# Patient Record
Sex: FEMALE | Race: WHITE
[De-identification: names, ages, dates, MRNs, and addresses within clinical notes are randomized per-mention and may not be internally consistent; named-entity substitution may affect disease eponyms.]

---

## 2019-09-12 ENCOUNTER — HOSPITAL ENCOUNTER (INPATIENT)
Dept: HOSPITAL 56 - MW.OBCHECK | Age: 29
LOS: 5 days | Discharge: HOME | DRG: 560 | End: 2019-09-17
Attending: OBSTETRICS & GYNECOLOGY | Admitting: OBSTETRICS & GYNECOLOGY
Payer: COMMERCIAL

## 2019-09-12 DIAGNOSIS — O48.0: ICD-10-CM

## 2019-09-12 DIAGNOSIS — Z87.891: ICD-10-CM

## 2019-09-12 DIAGNOSIS — Z3A.40: ICD-10-CM

## 2019-09-12 LAB
BUN SERPL-MCNC: 13 MG/DL (ref 7–18)
CHLORIDE SERPL-SCNC: 102 MMOL/L (ref 98–107)
CO2 SERPL-SCNC: 22.1 MMOL/L (ref 21–32)
GLUCOSE SERPL-MCNC: 99 MG/DL (ref 74–106)
POTASSIUM SERPL-SCNC: 3.7 MMOL/L (ref 3.5–5.1)
SODIUM SERPL-SCNC: 136 MMOL/L (ref 136–145)

## 2019-09-13 PROCEDURE — 10907ZC DRAINAGE OF AMNIOTIC FLUID, THERAPEUTIC FROM PRODUCTS OF CONCEPTION, VIA NATURAL OR ARTIFICIAL OPENING: ICD-10-PCS | Performed by: OBSTETRICS & GYNECOLOGY

## 2019-09-13 PROCEDURE — 3E0R3BZ INTRODUCTION OF ANESTHETIC AGENT INTO SPINAL CANAL, PERCUTANEOUS APPROACH: ICD-10-PCS | Performed by: OBSTETRICS & GYNECOLOGY

## 2019-09-13 PROCEDURE — 00HU33Z INSERTION OF INFUSION DEVICE INTO SPINAL CANAL, PERCUTANEOUS APPROACH: ICD-10-PCS | Performed by: OBSTETRICS & GYNECOLOGY

## 2019-09-13 PROCEDURE — 3E033VJ INTRODUCTION OF OTHER HORMONE INTO PERIPHERAL VEIN, PERCUTANEOUS APPROACH: ICD-10-PCS | Performed by: OBSTETRICS & GYNECOLOGY
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
## 2019-09-13 NOTE — OR
SURGEON:

Blas Naranjo MD

 

DATE OF PROCEDURE:  2019

 

INDICATIONS:

The patient is a 29-year-old G1, P0 at 40 weeks and 0 days, admitted for

induction of labor for gestational hypertension.  She was induced with

Pitocin and had spontaneous rupture of membranes.  She received an 

epidural with good pain relief.She progressed to fully

dilated and +2 station. Category 1 tracing with early decels.

 

PREOPERATIVE DIAGNOSES:

1. [place] intrauterine pregnancy at 40 weeks and 0 days.

2. Active second stage of labor.

3. Gestational hypertension.

 

POSTOPERATIVE DIAGNOSES:

1. [place] intrauterine pregnancy at 40 weeks and 0 days.

2. Active second stage of labor.

3. Gestational hypertension.

 

ANESTHESIA:

Epidural.

 

ANESTHESIOLOGIST:

Dr. Jaydon Leon.

 

ESTIMATED BLOOD LOSS:

300 mL.

 

FINDINGS:

[place] intrauterine pregnancy in cephalic presentation. Male fetus, weight of

3.4kg, Apgar scores of 8 and 9.

 

PROCEDURE:

Normal spontaneous vaginal delivery.

 

DESCRIPTION:

The patient pushed with contractions for approximately 20 minutes and progressed

to +3 station, had category 1 tracing with early decelerations.  Fetal head was

delivered over an intact perineum in occiput anterior position.  Tight double

nuchal cords were palpated.  Anterior shoulder was delivered easily followed by

the posterior shoulder and the body.  Nuchal cord was reduced after

delivery.  Baby was pink and crying, moving all extremities immediately after

delivery.  Baby was placed on the maternal chest and assessed by nursery team.  

Cord was clamped and cut. Cord gases were obtained.  Placenta was delivered

with gentle traction on the umbilical cord.  No lacerations were noted.  

Bimanual exam was performed, and fundus was firm and

below the umbilicus.  Bleeding was minimal.  The patient tolerated the procedure

well, and postpartum care instructions were given.

 

 

BECKY / BOB

DD:  2019 08:43:37

DT:  2019 15:44:49

Job #:  [id number]

NIKI

## 2019-09-13 NOTE — PCM.PRNOTE
- Free Text/Narrative


Note: 





Called for Labor Epidural.


 active labor.  3-4 cm.  contractions q3m.  Pain 8/10.


NKDA


Chart reviewed.


Discussed, ? answered, permit signed, wishes to proceed.





0202: Prep with chloroprep


0204: Local, needle inserted.  Space on 1st attempt via OPAL with saline/air 

mix.  Reconfirmed with saline.  


0206: Cath to 8 cm without issues.  Occlusive Drsg.


0209: Test dose with 1.5% lidocaine with 1:200K epi added.  TEST NEGATIVE.


0215: Bolus 0.2% Naropin 8ml + Fentanyl 100 mcg added.  Negative aspiration. 

Given over 5 minutes.  VS remained stable.


0230: Pain lessening significantly, 2/10.  Infusion started. 8ml/hr with 4ml 

bolus q15m.


Tolerated well.  No problems noted at present.

## 2019-09-14 RX ADMIN — AMPICILLIN SODIUM AND SULBACTAM SODIUM SCH MLS/HR: 2; 1 INJECTION, POWDER, FOR SOLUTION INTRAVENOUS at 08:32

## 2019-09-14 RX ADMIN — AMPICILLIN SODIUM AND SULBACTAM SODIUM SCH MLS/HR: 2; 1 INJECTION, POWDER, FOR SOLUTION INTRAVENOUS at 13:53

## 2019-09-14 RX ADMIN — DEXTROSE SCH UNITS: 10 SOLUTION INTRAVENOUS at 18:56

## 2019-09-14 RX ADMIN — AMPICILLIN SODIUM AND SULBACTAM SODIUM SCH MLS/HR: 2; 1 INJECTION, POWDER, FOR SOLUTION INTRAVENOUS at 20:04

## 2019-09-14 NOTE — PCM.PNPP
- General Info


Date of Service: 19


Subjective Update: 


28 yo P1 s/p  PPD1 , she complains of fever and chills . Temp at bedside 101

, she also 


states she had chills since after she had the epidural.


she complains of some cough , denies sputum , she is breastfeeding well , she 

denies dysuria 





Functional Status: Reports: Pain Controlled, Tolerating Diet, Ambulating, 

Urinating





- Review of Systems


General: Reports: Fever


HEENT: Reports: No Symptoms


Pulmonary: Reports: Cough


Cardiovascular: Reports: No Symptoms


Gastrointestinal: Reports: No Symptoms


Genitourinary: Reports: No Symptoms


Musculoskeletal: Reports: No Symptoms


Skin: Reports: No Symptoms


Neurological: Reports: No Symptoms


Psychiatric: Reports: No Symptoms





- General Info


Date of Service: 19





- Patient Data


Vital Signs - Most Recent: 


 Last Vital Signs











Temp  37.3 C   19 08:38


 


Pulse  90   19 08:10


 


Resp  20   19 08:10


 


BP  107/69   19 08:10


 


Pulse Ox  96   19 08:10











Weight - Most Recent: 83.915 kg


Lab Results - Last 24 Hours: 


 Laboratory Results - last 24 hr











  19 Range/Units





  05:40 05:40 


 


WBC   14.19 H  (4.0-11.0)  K/uL


 


RBC   4.13 L  (4.30-5.90)  M/uL


 


Hgb  12.0  12.1  (12.0-16.0)  g/dL


 


Hct  36.4  36.3  (36.0-46.0)  %


 


MCV   87.9  (80.0-98.0)  fL


 


MCH   29.3  (27.0-32.0)  pg


 


MCHC   33.3  (31.0-37.0)  g/dL


 


RDW Std Deviation   46.3  (28.0-62.0)  fl


 


RDW Coeff of Jaclyn   14  (11.0-15.0)  %


 


Plt Count   139 L  (150-400)  K/uL


 


MPV   10.80  (7.40-12.00)  fL


 


Neut % (Auto)   80.5 H  (48.0-80.0)  %


 


Lymph % (Auto)   9.4 L  (16.0-40.0)  %


 


Mono % (Auto)   9.5  (0.0-15.0)  %


 


Eos % (Auto)   0.4  (0.0-7.0)  %


 


Baso % (Auto)   0.2  (0.0-1.5)  %


 


Neut # (Auto)   11.4 H  (1.4-5.7)  K/uL


 


Lymph # (Auto)   1.3  (0.6-2.4)  K/uL


 


Mono # (Auto)   1.4 H  (0.0-0.8)  K/uL


 


Eos # (Auto)   0.1  (0.0-0.7)  K/uL


 


Baso # (Auto)   0.0  (0.0-0.1)  K/uL


 


Nucleated RBC %   0.0  /100WBC


 


Nucleated RBCs #   0  K/uL











Med Orders - Current: 


 Current Medications





Acetaminophen (Tylenol Extra Strength)  500 mg PO Q4H PRN


   PRN Reason: Pain


Acetaminophen (Tylenol Extra Strength)  1,000 mg PO Q4H PRN


   PRN Reason: Pain


   Last Admin: 19 08:03 Dose:  1,000 mg


Benzocaine/Menthol (Dermoplast Pain Relief 20%-0.5% Spray)  78 gm TOP 

ASDIRECTED PRN


   PRN Reason: Perineal Comfort Measure


Bisacodyl (Dulcolax)  10 mg RECTAL ONETIME PRN


   PRN Reason: Constipation


Butorphanol Tartrate (Stadol)  1 mg IVPUSH Q1H PRN


   PRN Reason: Pain


   Last Admin: 19 00:25 Dose:  1 mg


Carboprost Tromethamine (Hemabate Ds)  250 mcg IM ASDIRECTED PRN


   PRN Reason: Post Partum Hemorrhage


Docusate Sodium (Colace)  100 mg PO BID PRN


   PRN Reason: Constipation


Emollient Ointment (Lansinoh Hpa)  0 gm TOP ASDIRECTED PRN


   PRN Reason: Sore Nipples


Lactated Ringer's (Ringers, Lactated)  1,000 mls @ 150 mls/hr IV ASDIRECTED BRANNON


   Last Admin: 19 03:55 Dose:  500 mls/hr


Oxytocin/Sodium Chloride (Oxytocin 30 Unit/500 Ml-Ns)  30 unit in 500 mls @ 2 

mls/hr IV TITRATE BRANNON; Protocol


   Last Titration: 19 06:25 Dose:  999 munits/min, 999 mls/hr


Oxytocin/Sodium Chloride (Oxytocin 30 Unit/500 Ml-Ns)  30 unit in 500 mls @ 500 

mls/hr IV TITRATE [facility]


Tranexamic Acid 1,000 mg/ (Sodium Chloride)  110 mls @ 660 mls/hr IV ONETIME PRN


   PRN Reason: Bleeding


Ampicillin Sodium/Sulbactam (Sodium 3 gm/ Sodium Chloride)  100 mls @ 200 mls/

hr IV Q6H [facility]


   Last Admin: 19 08:32 Dose:  200 mls/hr


Ibuprofen (Motrin)  400 mg PO Q4H PRN


   PRN Reason: Pain


Ibuprofen (Motrin)  800 mg PO Q6H PRN


   PRN Reason: Pain


   Last Admin: 19 09:28 Dose:  800 mg


Lidocaine HCl (Xylocaine 1%)  50 ml INJECT ONETIME PRN


   PRN Reason: Laceration repair


Misoprostol (Cytotec)  200 mcg PO ONETIME PRN


   PRN Reason: Post Partum Hemorrhage


Nalbuphine HCl (Nubain)  10 mg IVPUSH Q1H PRN


   PRN Reason: Pain (severe 7-10)


Ondansetron HCl (Zofran)  4 mg IVPUSH Q6H PRN


   PRN Reason: Nausea/Vomiting


Sodium Chloride (Saline Flush)  10 ml FLUSH ASDIRECTED PRN


   PRN Reason: Keep Vein Open


Sodium Chloride (Saline Flush)  2.5 ml FLUSH ASDIRECTED PRN


   PRN Reason: Keep Vein Open


Sodium Chloride (Normal Saline)  10 ml IV ASDIRECTED PRN


   PRN Reason: IV Use


Sterile Water (Sterile Water For Irrigation)  1,000 ml IRR ASDIRECTED PRN


   PRN Reason: delivery


   Last Admin: 19 06:10 Dose:  1,000 ml


Terbutaline Sulfate (Brethine)  0.25 mg SUBCUT ASDIRECTED PRN


   PRN Reason: Tacysystole


Witch Hazel (Tucks)  1 pad TOP ASDIRECTED PRN


   PRN Reason: comfort care





Discontinued Medications





Fentanyl (Sublimaze) Confirm Administered Dose 100 mcg .ROUTE .STK-MED ONE


   Stop: 19 01:58


   Last Admin: 19 20:00 Dose:  Not Given


Ropivacaine (Naropin 0.2%) Confirm Administered Dose 100 mls @ as directed 

.ROUTE .STK-MED ONE


   Stop: 19 01:58


   Last Admin: 19 20:00 Dose:  Not Given


Methylergonovine Maleate (Methergine)  0.2 mg IM ASDIRECTED PRN


   PRN Reason: Post Partum Hemorrhage


Ropivacaine (Naropin 0.2%) Confirm Administered Dose 20 ml .ROUTE .STK-MED ONE


   Stop: 19 01:58


   Last Admin: 19 19:59 Dose:  Not Given











- Infant Interaction


Support Person: 





- Postpartum Recovery Exam


Fundal Tone: Firm


Fundal Level: 1 Fingerbreadths Below Umbilicus


Fundal Placement: Midline


Lochia Amount: Scant


Lochia Color: Rubra/Red


Perineum Description: Intact, Minimal Bruising/Swelling


Episiotomy/Laceration: None


Bladder Status: Voiding





- Exam


General: Alert, Oriented


HEENT: Pupils Equal, Pupils Reactive


Neck: Supple


Lungs: Clear to Auscultation


Cardiovascular: Regular Rate, Regular Rhythm


GI/Abdominal Exam: Normal Bowel Sounds


Extremities: Normal Inspection


Skin: Warm


Psy/Mental Status: Alert





- Problem List & Annotations


(1) Vaginal delivery


SNOMED Code(s): 035654394


   Code(s): O80 - ENCOUNTER FOR FULL-TERM UNCOMPLICATED DELIVERY   Status: 

Acute   Current Visit: Yes   





(2) Fever


SNOMED Code(s): 424225452


   Code(s): R50.9 - FEVER, UNSPECIFIED   Status: Acute   Current Visit: Yes   


Qualifiers: 


   Fever type: unspecified   Qualified Code(s): R50.9 - Fever, unspecified   





- Problem List Review


Problem List Initiated/Reviewed/Updated: Yes





- My Orders


Last 24 Hours: 


My Active Orders





19 08:00


Ampicillin/Sulbactam Na [Unasyn] 3 gm   Sodium Chloride 0.9% [Normal Saline] 

100 ml IV Q6H 





09/15/19 06:00


CBC WITH AUTO DIFF [HEME] Routine 














- Assessment


Assessment:: 





28 yo P1 s/p   postpartum fever , source suspect endometritis, breastfeeding

, normal lochia 





- Plan


Plan:: 


Unasyn 3g q 6


Pain control as needed


Tylenol for fever


CBC with diff


Repeat tomorrow also

## 2019-09-14 NOTE — PCM.SN
- Free Text/Narrative


Note: 





I was informed by nurse that patient is having temperature persistently > 101 . 

She has been on tylenol and motrin continously.


I came to visit with patient and offered a fever work up to include , CXR , 

Blood culture , lactic acid level , Urine culture. 


Patient does had blood culture done. I also recommended heparin 5000 iu bid. 

Patient declines any management and states


she will want to go home. I informed her that this is very risk and she is at 

risk of severe sepsis and maternal morbidity and mortality.


she was  informed that she is currently high risk due to her fever. she states 

the fever is improved now and she will like to go home.


I informed her that she will need to sign AMA. i offered her oral antibiotics 

to go home with . she declines.

## 2019-09-15 RX ADMIN — AMPICILLIN SODIUM AND SULBACTAM SODIUM SCH MLS/HR: 2; 1 INJECTION, POWDER, FOR SOLUTION INTRAVENOUS at 08:32

## 2019-09-15 RX ADMIN — AMPICILLIN SODIUM AND SULBACTAM SODIUM SCH MLS/HR: 2; 1 INJECTION, POWDER, FOR SOLUTION INTRAVENOUS at 02:11

## 2019-09-15 RX ADMIN — DEXTROSE SCH UNITS: 10 SOLUTION INTRAVENOUS at 06:05

## 2019-09-15 RX ADMIN — DEXTROSE SCH UNITS: 10 SOLUTION INTRAVENOUS at 17:53

## 2019-09-15 RX ADMIN — CLINDAMYCIN PHOSPHATE SCH MLS/HR: 900 INJECTION, SOLUTION INTRAVENOUS at 17:21

## 2019-09-15 RX ADMIN — CLINDAMYCIN PHOSPHATE SCH MLS/HR: 900 INJECTION, SOLUTION INTRAVENOUS at 09:35

## 2019-09-15 NOTE — CR
Indication:



Rule out sepsis.



Technique:



An AP portable view of the chest were obtained.



Comparison:



None



Findings:



The heart is normal in size. The lungs are clear. No infiltrate, pleural 

effusion, or pneumothorax is identified.



Impression:



No acute cardiopulmonary process.



Dictated by Kalyn Vazquez MD @ Sep 15 2019  8:09AM



Signed by Dr. Kalyn Vazquez @ Sep 15 2019  8:10AM

## 2019-09-15 NOTE — PCM.PNPP
- General Info


Date of Service: 09/15/19


Subjective Update: 


30 yo P1 s/p  PPD2 , with postpartum fever, last fever 39.3 @ 400am, 


WBC count today is 18 .2 with neutrophilia from 14


She denies headache , RUQ pain , BV , Abodminal pain , cough , dysuria . she 

has receieved unasyn for 24 hrs 





Functional Status: Reports: Pain Controlled, Tolerating Diet, Ambulating, 

Urinating





- Review of Systems


General: Reports: No Symptoms


HEENT: Reports: No Symptoms


Pulmonary: Reports: No Symptoms


Cardiovascular: Reports: No Symptoms


Gastrointestinal: Reports: No Symptoms


Genitourinary: Reports: No Symptoms


Musculoskeletal: Reports: No Symptoms


Skin: Reports: No Symptoms


Neurological: Reports: No Symptoms


Psychiatric: Reports: No Symptoms





- General Info


Date of Service: 09/15/19





- Patient Data


Vital Signs - Most Recent: 


 Last Vital Signs











Temp  37.4 C   09/15/19 08:07


 


Pulse  87   09/15/19 08:07


 


Resp  20   09/15/19 08:07


 


BP  119/69   09/15/19 08:07


 


Pulse Ox  95   09/15/19 08:07











Weight - Most Recent: 83.915 kg


Lab Results - Last 24 Hours: 


 Laboratory Results - last 24 hr











  09/14/19 09/14/19 09/15/19 Range/Units





  17:42 19:30 05:22 


 


WBC    18.22 H  (4.0-11.0)  K/uL


 


RBC    4.07 L  (4.30-5.90)  M/uL


 


Hgb    11.7 L  (12.0-16.0)  g/dL


 


Hct    35.6 L  (36.0-46.0)  %


 


MCV    87.5  (80.0-98.0)  fL


 


MCH    28.7  (27.0-32.0)  pg


 


MCHC    32.9  (31.0-37.0)  g/dL


 


RDW Std Deviation    46.1  (28.0-62.0)  fl


 


RDW Coeff of Jaclyn    14  (11.0-15.0)  %


 


Plt Count    137 L  (150-400)  K/uL


 


MPV    10.90  (7.40-12.00)  fL


 


Neut % (Auto)    86.7 H  (48.0-80.0)  %


 


Lymph % (Auto)    4.6 L  (16.0-40.0)  %


 


Mono % (Auto)    8.5  (0.0-15.0)  %


 


Eos % (Auto)    0.1  (0.0-7.0)  %


 


Baso % (Auto)    0.1  (0.0-1.5)  %


 


Neut # (Auto)    15.8 H  (1.4-5.7)  K/uL


 


Lymph # (Auto)    0.8  (0.6-2.4)  K/uL


 


Mono # (Auto)    1.5 H  (0.0-0.8)  K/uL


 


Eos # (Auto)    0.0  (0.0-0.7)  K/uL


 


Baso # (Auto)    0.0  (0.0-0.1)  K/uL


 


Nucleated RBC %    0.0  /100WBC


 


Nucleated RBCs #    0  K/uL


 


Lactate  0.9    (0.20-2.00)  mmol/L


 


Urine Color   YELLOW   


 


Urine Appearance   SLT CLOUDY   


 


Urine pH   7.0   (5.0-8.0)  


 


Ur Specific Gravity   1.010   (1.001-1.035)  


 


Urine Protein   NEGATIVE   (NEGATIVE)  mg/dL


 


Urine Glucose (UA)   NEGATIVE   (NEGATIVE)  mg/dL


 


Urine Ketones   NEGATIVE   (NEGATIVE)  mg/dL


 


Urine Occult Blood   LARGE H   (NEGATIVE)  


 


Urine Nitrite   NEGATIVE   (NEGATIVE)  


 


Urine Bilirubin   NEGATIVE   (NEGATIVE)  


 


Urine Urobilinogen   0.2   (<2.0)  EU/dL


 


Ur Leukocyte Esterase   SMALL H   (NEGATIVE)  


 


Urine RBC   10-15   (0-2/HPF)  


 


Urine WBC   8-10   (0-5/HPF)  


 


Ur Epithelial Cells   FEW   (NONE-FEW)  


 


Urine Bacteria   RARE   (NEGATIVE)  











Med Orders - Current: 


 Current Medications





Acetaminophen (Tylenol Extra Strength)  500 mg PO Q4H PRN


   PRN Reason: Pain


   Last Admin: 09/15/19 03:31 Dose:  500 mg


Acetaminophen (Tylenol Extra Strength)  1,000 mg PO Q4H PRN


   PRN Reason: Pain


   Last Admin: 19 12:49 Dose:  1,000 mg


Benzocaine/Menthol (Dermoplast Pain Relief 20%-0.5% Spray)  78 gm TOP 

ASDIRECTED PRN


   PRN Reason: Perineal Comfort Measure


Bisacodyl (Dulcolax)  10 mg RECTAL ONETIME PRN


   PRN Reason: Constipation


Butorphanol Tartrate (Stadol)  1 mg IVPUSH Q1H PRN


   PRN Reason: Pain


   Last Admin: 19 00:25 Dose:  1 mg


Carboprost Tromethamine (Hemabate Ds)  250 mcg IM ASDIRECTED PRN


   PRN Reason: Post Partum Hemorrhage


Docusate Sodium (Colace)  100 mg PO BID PRN


   PRN Reason: Constipation


   Last Admin: 09/15/19 08:41 Dose:  100 mg


Emollient Ointment (Lansinoh Hpa)  0 gm TOP ASDIRECTED PRN


   PRN Reason: Sore Nipples


Gentamicin Sulfate (Pharmacy To Dose - Gentamicin)  1 dose .XX ASDIRECTED [facility]


Heparin Sodium (Porcine) (Heparin Sodium)  5,000 units SUBCUT Q12H [facility]


   Last Admin: 09/15/19 06:05 Dose:  5,000 units


Lactated Ringer's (Ringers, Lactated)  1,000 mls @ 150 mls/hr IV ASDIRECTED [facility]


   Last Admin: 19 03:55 Dose:  500 mls/hr


Oxytocin/Sodium Chloride (Oxytocin 30 Unit/500 Ml-Ns)  30 unit in 500 mls @ 2 

mls/hr IV TITRATE [facility]; Protocol


   Last Titration: 19 06:25 Dose:  999 munits/min, 999 mls/hr


Oxytocin/Sodium Chloride (Oxytocin 30 Unit/500 Ml-Ns)  30 unit in 500 mls @ 500 

mls/hr IV TITRATE [facility]


Tranexamic Acid 1,000 mg/ (Sodium Chloride)  110 mls @ 660 mls/hr IV ONETIME PRN


   PRN Reason: Bleeding


Clindamycin Phosphate 900 mg/ (Premix)  50 mls @ 100 mls/hr IV Q8H [facility]


   Last Admin: 09/15/19 09:35 Dose:  100 mls/hr


Gentamicin Sulfate 420 mg/ (Sodium Chloride)  110.5 mls @ 221 mls/hr IV Q24H [facility]


Ibuprofen (Motrin)  400 mg PO Q4H PRN


   PRN Reason: Pain


Ibuprofen (Motrin)  800 mg PO Q6H PRN


   PRN Reason: Pain


   Last Admin: 19 16:39 Dose:  800 mg


Lidocaine HCl (Xylocaine 1%)  50 ml INJECT ONETIME PRN


   PRN Reason: Laceration repair


Misoprostol (Cytotec)  200 mcg PO ONETIME PRN


   PRN Reason: Post Partum Hemorrhage


Nalbuphine HCl (Nubain)  10 mg IVPUSH Q1H PRN


   PRN Reason: Pain (severe 7-10)


Ondansetron HCl (Zofran)  4 mg IVPUSH Q6H PRN


   PRN Reason: Nausea/Vomiting


Sodium Chloride (Saline Flush)  10 ml FLUSH ASDIRECTED PRN


   PRN Reason: Keep Vein Open


Sodium Chloride (Saline Flush)  2.5 ml FLUSH ASDIRECTED PRN


   PRN Reason: Keep Vein Open


Sodium Chloride (Normal Saline)  10 ml IV ASDIRECTED PRN


   PRN Reason: IV Use


Sterile Water (Sterile Water For Irrigation)  1,000 ml IRR ASDIRECTED PRN


   PRN Reason: delivery


   Last Admin: 19 06:10 Dose:  1,000 ml


Terbutaline Sulfate (Brethine)  0.25 mg SUBCUT ASDIRECTED PRN


   PRN Reason: Tacysystole


Witch Hazel (Tucks)  1 pad TOP ASDIRECTED PRN


   PRN Reason: comfort care





Discontinued Medications





Fentanyl (Sublimaze) Confirm Administered Dose 100 mcg .ROUTE .STK-MED ONE


   Stop: 19 01:58


   Last Admin: 19 20:00 Dose:  Not Given


Ropivacaine (Naropin 0.2%) Confirm Administered Dose 100 mls @ as directed 

.ROUTE .STK-MED ONE


   Stop: 19 01:58


   Last Admin: 19 20:00 Dose:  Not Given


Ampicillin Sodium/Sulbactam (Sodium 3 gm/ Sodium Chloride)  100 mls @ 200 mls/

hr IV Q6H [facility]


   Last Admin: 09/15/19 08:32 Dose:  200 mls/hr


Methylergonovine Maleate (Methergine)  0.2 mg IM ASDIRECTED PRN


   PRN Reason: Post Partum Hemorrhage


Ropivacaine (Naropin 0.2%) Confirm Administered Dose 20 ml .ROUTE .STK-MED ONE


   Stop: 19 01:58


   Last Admin: 19 19:59 Dose:  Not Given











- Infant Interaction


Support Person: 





- Postpartum Recovery Exam


Fundal Tone: Firm


Fundal Level: 2 Fingerbreadths Below Umbilicus


Fundal Placement: Midline


Lochia Amount: Scant


Lochia Color: Rubra/Red


Perineum Description: Intact, Minimal Bruising/Swelling


Episiotomy/Laceration: None


Bladder Status: Voiding





- Exam


General: Alert


HEENT: Pupils Equal


Neck: Supple


Lungs: Clear to Auscultation


Cardiovascular: Regular Rate, Regular Rhythm


GI/Abdominal Exam: Normal Bowel Sounds


Extremities: Normal Inspection


Neurological: No New Focal Deficit


Psy/Mental Status: Alert





- Problem List & Annotations


(1) Vaginal delivery


SNOMED Code(s): 193635590


   Code(s): O80 - ENCOUNTER FOR FULL-TERM UNCOMPLICATED DELIVERY   Status: 

Acute   Current Visit: Yes   





(2) Fever


SNOMED Code(s): 727266515


   Code(s): R50.9 - FEVER, UNSPECIFIED   Status: Acute   Current Visit: Yes   


Qualifiers: 


   Fever type: unspecified   Qualified Code(s): R50.9 - Fever, unspecified   





- Problem List Review


Problem List Initiated/Reviewed/Updated: Yes





- My Orders


Last 24 Hours: 


My Active Orders





19 17:30


Blood Culture x2 Reflex Set [OM.PC] Stat 





19 17:42


CULTURE BLOOD [BC] Stat 





19 18:00


Heparin Sodium   5,000 units SUBCUT Q12H 





09/15/19 08:45


Clindamycin Phosphate in D5W [Cleocin in D5W] 900 mg   Premix Bag 1 bag IV Q8H 


Pharmacy to Dose - Gentamicin   1 dose .XX ASDIRECTED 





09/15/19 09:45


Gentamicin 420 mg   Sodium Chloride 0.9% [Normal Saline] 100 ml IV Q24H 














- Assessment


Assessment:: 





30 yo P1 s/p   PPD2 with postpartum fever, increasing WBC count , UA 

negative for nitrites , CXR negative , Blood culture pending, normal lactate 

level 


On unasyn 


Breastfeeding, normal lochia   





- Plan


Plan:: 


Antipyretic as needed


Will change antibiotics to clindamycin and gentamicin


Encourage ambulation and breastfeeding

## 2019-09-16 RX ADMIN — CLINDAMYCIN PHOSPHATE SCH MLS/HR: 900 INJECTION, SOLUTION INTRAVENOUS at 09:39

## 2019-09-16 RX ADMIN — DEXTROSE SCH UNITS: 10 SOLUTION INTRAVENOUS at 06:25

## 2019-09-16 RX ADMIN — CLINDAMYCIN PHOSPHATE SCH MLS/HR: 900 INJECTION, SOLUTION INTRAVENOUS at 00:30

## 2019-09-16 RX ADMIN — CLINDAMYCIN PHOSPHATE SCH MLS/HR: 900 INJECTION, SOLUTION INTRAVENOUS at 17:45

## 2019-09-16 RX ADMIN — DEXTROSE SCH UNITS: 10 SOLUTION INTRAVENOUS at 18:05

## 2019-09-16 NOTE — PCM.PNPP
- General Info


Date of Service: 19


Admission Dx/Problem (Free Text): 


Feeling much better, denies chills muscle aches, headache or  other concerns


Functional Status: Reports: Pain Controlled, Tolerating Diet, Ambulating, 

Urinating





- Review of Systems


General: Reports: No Symptoms


HEENT: Reports: No Symptoms


Pulmonary: Reports: No Symptoms


Cardiovascular: Reports: No Symptoms


Gastrointestinal: Reports: No Symptoms


Genitourinary: Reports: No Symptoms


Musculoskeletal: Reports: No Symptoms


Skin: Reports: No Symptoms


Neurological: Reports: No Symptoms


Psychiatric: Reports: No Symptoms





- Patient Data


Vital Signs - Most Recent: 


 Last Vital Signs











Temp  36.4 C   19 08:00


 


Pulse  54 L  19 08:00


 


Resp  16   19 08:00


 


BP  123/85   19 08:00


 


Pulse Ox  98   19 08:00











Weight - Most Recent: 83.915 kg


Lab Results - Last 24 Hours: 


 Laboratory Results - last 24 hr











  19 Range/Units





  05:10 


 


WBC  17.66 H  (4.0-11.0)  K/uL


 


RBC  4.44  (4.30-5.90)  M/uL


 


Hgb  12.9  (12.0-16.0)  g/dL


 


Hct  39.2  (36.0-46.0)  %


 


MCV  88.3  (80.0-98.0)  fL


 


MCH  29.1  (27.0-32.0)  pg


 


MCHC  32.9  (31.0-37.0)  g/dL


 


RDW Std Deviation  47.7  (28.0-62.0)  fl


 


RDW Coeff of Jaclyn  15  (11.0-15.0)  %


 


Plt Count  152  (150-400)  K/uL


 


MPV  10.90  (7.40-12.00)  fL


 


Add Manual Diff  YES  


 


Neutrophils % (Manual)  68  (48.0-80.0)  %


 


Band Neutrophils %  2  %


 


Lymphocytes % (Manual)  16  (16.0-40.0)  %


 


Monocytes % (Manual)  10  (0.0-15.0)  %


 


Eosinophils % (Manual)  4  (0.0-7.0)  %


 


Nucleated RBC %  0.0  /100WBC


 


Absolute Seg Neuts  12.0 H  (1.4-5.7)  


 


Band Neutrophils #  0.4  


 


Lymphocytes # (Manual)  2.8 H  (0.6-2.4)  


 


Monocytes # (Manual)  1.8 H  (0.0-0.8)  


 


Eosinophils # (Manual)  0.7  (0.0-0.7)  


 


Nucleated RBCs #  0  K/uL











Micro Results - Last 24 Hours: 


 Microbiology











 19 17:42 Aerobic Blood Culture - Preliminary





 Blood - Venous    NO GROWTH AFTER 1 DAY





 Anaerobic Blood Culture - Preliminary





    NO GROWTH AFTER 1 DAY











Med Orders - Current: 


 Current Medications





Acetaminophen (Tylenol Extra Strength)  500 mg PO Q4H PRN


   PRN Reason: Pain


   Last Admin: 09/15/19 03:31 Dose:  500 mg


Acetaminophen (Tylenol Extra Strength)  1,000 mg PO Q4H PRN


   PRN Reason: Pain


   Last Admin: 09/15/19 23:21 Dose:  1,000 mg


Benzocaine/Menthol (Dermoplast Pain Relief 20%-0.5% Spray)  78 gm TOP 

ASDIRECTED PRN


   PRN Reason: Perineal Comfort Measure


Bisacodyl (Dulcolax)  10 mg RECTAL ONETIME PRN


   PRN Reason: Constipation


Butorphanol Tartrate (Stadol)  1 mg IVPUSH Q1H PRN


   PRN Reason: Pain


   Last Admin: 19 00:25 Dose:  1 mg


Carboprost Tromethamine (Hemabate Ds)  250 mcg IM ASDIRECTED PRN


   PRN Reason: Post Partum Hemorrhage


Docusate Sodium (Colace)  100 mg PO BID PRN


   PRN Reason: Constipation


   Last Admin: 09/15/19 08:41 Dose:  100 mg


Emollient Ointment (Lansinoh Hpa)  0 gm TOP ASDIRECTED PRN


   PRN Reason: Sore Nipples


Gentamicin Sulfate (Pharmacy To Dose - Gentamicin)  1 dose .XX ASDIRECTED [facility]


Heparin Sodium (Porcine) (Heparin Sodium)  5,000 units SUBCUT Q12H [facility]


   Last Admin: 19 06:25 Dose:  5,000 units


Lactated Ringer's (Ringers, Lactated)  1,000 mls @ 150 mls/hr IV ASDIRECTED [facility]


   Last Admin: 19 03:55 Dose:  500 mls/hr


Oxytocin/Sodium Chloride (Oxytocin 30 Unit/500 Ml-Ns)  30 unit in 500 mls @ 2 

mls/hr IV TITRATE [facility]; Protocol


   Last Titration: 19 06:25 Dose:  999 munits/min, 999 mls/hr


Oxytocin/Sodium Chloride (Oxytocin 30 Unit/500 Ml-Ns)  30 unit in 500 mls @ 500 

mls/hr IV TITRATE [facility]


Tranexamic Acid 1,000 mg/ (Sodium Chloride)  110 mls @ 660 mls/hr IV ONETIME PRN


   PRN Reason: Bleeding


Clindamycin Phosphate 900 mg/ (Premix)  50 mls @ 100 mls/hr IV Q8H [facility]


   Last Admin: 19 09:39 Dose:  100 mls/hr


Gentamicin Sulfate 420 mg/ (Sodium Chloride)  110.5 mls @ 221 mls/hr IV Q24H [facility]


   Last Admin: 19 10:28 Dose:  221 mls/hr


Ibuprofen (Motrin)  400 mg PO Q4H PRN


   PRN Reason: Pain


Ibuprofen (Motrin)  800 mg PO Q6H PRN


   PRN Reason: Pain


   Last Admin: 19 16:39 Dose:  800 mg


Lidocaine HCl (Xylocaine 1%)  50 ml INJECT ONETIME PRN


   PRN Reason: Laceration repair


Misoprostol (Cytotec)  200 mcg PO ONETIME PRN


   PRN Reason: Post Partum Hemorrhage


Nalbuphine HCl (Nubain)  10 mg IVPUSH Q1H PRN


   PRN Reason: Pain (severe 7-10)


Ondansetron HCl (Zofran)  4 mg IVPUSH Q6H PRN


   PRN Reason: Nausea/Vomiting


Sodium Chloride (Saline Flush)  10 ml FLUSH ASDIRECTED PRN


   PRN Reason: Keep Vein Open


Sodium Chloride (Saline Flush)  2.5 ml FLUSH ASDIRECTED PRN


   PRN Reason: Keep Vein Open


Sodium Chloride (Normal Saline)  10 ml IV ASDIRECTED PRN


   PRN Reason: IV Use


Sterile Water (Sterile Water For Irrigation)  1,000 ml IRR ASDIRECTED PRN


   PRN Reason: delivery


   Last Admin: 19 06:10 Dose:  1,000 ml


Terbutaline Sulfate (Brethine)  0.25 mg SUBCUT ASDIRECTED PRN


   PRN Reason: Tacysystole


Witch Hazel (Tucks)  1 pad TOP ASDIRECTED PRN


   PRN Reason: comfort care





Discontinued Medications





Fentanyl (Sublimaze) Confirm Administered Dose 100 mcg .ROUTE .STK-MED ONE


   Stop: 19 01:58


   Last Admin: 19 20:00 Dose:  Not Given


Ropivacaine (Naropin 0.2%) Confirm Administered Dose 100 mls @ as directed 

.ROUTE .STK-MED ONE


   Stop: 19 01:58


   Last Admin: 19 20:00 Dose:  Not Given


Ampicillin Sodium/Sulbactam (Sodium 3 gm/ Sodium Chloride)  100 mls @ 200 mls/

hr IV Q6H [facility]


   Last Admin: 09/15/19 08:32 Dose:  200 mls/hr


Methylergonovine Maleate (Methergine)  0.2 mg IM ASDIRECTED PRN


   PRN Reason: Post Partum Hemorrhage


Ropivacaine (Naropin 0.2%) Confirm Administered Dose 20 ml .ROUTE .STK-MED ONE


   Stop: 19 01:58


   Last Admin: 19 19:59 Dose:  Not Given











- Infant Interaction


Infant Disposition, Postpartum: [place] in Room with Family


Infant Interaction: Holding Infant


Support Person: 





- Postpartum Recovery Exam


Fundal Tone: Firm


Fundal Level: 2 Fingerbreadths Below Umbilicus


Fundal Placement: Midline


Lochia Amount: Scant


Lochia Color: Rubra/Red


Perineum Description: Intact, Minimal Bruising/Swelling


Episiotomy/Laceration: None


Bladder Status: Voiding





- Exam


General: Alert


Neck: Supple


Lungs: Clear to Auscultation, Normal Respiratory Effort


Cardiovascular: Regular Rate, Regular Rhythm


GI/Abdominal Exam: Soft, Non-Tender


Skin: Warm, Dry, Intact


Neurological: No New Focal Deficit


Psy/Mental Status: Alert, Normal Affect, Normal Mood





- Problem List Review


Problem List Initiated/Reviewed/Updated: Yes





- Assessment


Assessment:: 





28 yo P1 s/p   PPD3 with postpartum fever, afebrile for 12 hours on 

Gentamicin and clindamycin.   Vitals are stable.   





- Plan


Plan:: 


Continue clindamycin and gentamicin.  I would like her to be afebrile for 24 

hours prior to discharge.  She agrees to continue with inpatient IV antibiotics 

until tomorrow.

## 2019-09-17 RX ADMIN — CLINDAMYCIN PHOSPHATE SCH MLS/HR: 900 INJECTION, SOLUTION INTRAVENOUS at 09:00

## 2019-09-17 RX ADMIN — DEXTROSE SCH UNITS: 10 SOLUTION INTRAVENOUS at 06:43

## 2019-09-17 RX ADMIN — CLINDAMYCIN PHOSPHATE SCH MLS/HR: 900 INJECTION, SOLUTION INTRAVENOUS at 01:10

## 2019-09-17 NOTE — PCM.PNPP
- General Info


Date of Service: 19


Subjective Update: 





patient denies chills, myalgia, cough, headache or other symptoms. 


Functional Status: Reports: Pain Controlled





- Review of Systems


General: Reports: No Symptoms


HEENT: Reports: No Symptoms


Pulmonary: Reports: No Symptoms


Cardiovascular: Reports: No Symptoms


Gastrointestinal: Reports: No Symptoms


Genitourinary: Reports: No Symptoms


Musculoskeletal: Reports: No Symptoms


Skin: Reports: No Symptoms


Neurological: Reports: No Symptoms


Psychiatric: Reports: No Symptoms





- Patient Data


Vital Signs - Most Recent: 


 Last Vital Signs











Temp  37.1 C   19 05:12


 


Pulse  64   19 05:12


 


Resp  17   19 05:12


 


BP  124/73   19 05:12


 


Pulse Ox  97   19 05:12











Weight - Most Recent: 83.915 kg


Micro Results - Last 24 Hours: 


 Microbiology











 19 17:42 Aerobic Blood Culture - Preliminary





 Blood - Venous    NO GROWTH AFTER 2 DAYS





 Anaerobic Blood Culture - Preliminary





    NO GROWTH AFTER 2 DAYS











Med Orders - Current: 


 Current Medications





Acetaminophen (Tylenol Extra Strength)  500 mg PO Q4H PRN


   PRN Reason: Pain


   Last Admin: 09/15/19 03:31 Dose:  500 mg


Acetaminophen (Tylenol Extra Strength)  1,000 mg PO Q4H PRN


   PRN Reason: Pain


   Last Admin: 19 14:53 Dose:  1,000 mg


Benzocaine/Menthol (Dermoplast Pain Relief 20%-0.5% Spray)  78 gm TOP 

ASDIRECTED PRN


   PRN Reason: Perineal Comfort Measure


Bisacodyl (Dulcolax)  10 mg RECTAL ONETIME PRN


   PRN Reason: Constipation


Butorphanol Tartrate (Stadol)  1 mg IVPUSH Q1H PRN


   PRN Reason: Pain


   Last Admin: 19 00:25 Dose:  1 mg


Carboprost Tromethamine (Hemabate Ds)  250 mcg IM ASDIRECTED PRN


   PRN Reason: Post Partum Hemorrhage


Docusate Sodium (Colace)  100 mg PO BID PRN


   PRN Reason: Constipation


   Last Admin: 09/15/19 08:41 Dose:  100 mg


Emollient Ointment (Lansinoh Hpa)  0 gm TOP ASDIRECTED PRN


   PRN Reason: Sore Nipples


Gentamicin Sulfate (Pharmacy To Dose - Gentamicin)  1 dose .XX ASDIRECTED [facility]


Heparin Sodium (Porcine) (Heparin Sodium)  5,000 units SUBCUT Q12H [facility]


   Last Admin: 19 06:43 Dose:  5,000 units


Lactated Ringer's (Ringers, Lactated)  1,000 mls @ 150 mls/hr IV ASDIRECTED [facility]


   Last Admin: 19 03:55 Dose:  500 mls/hr


Oxytocin/Sodium Chloride (Oxytocin 30 Unit/500 Ml-Ns)  30 unit in 500 mls @ 2 

mls/hr IV TITRATE [facility]; Protocol


   Last Titration: 19 06:25 Dose:  999 munits/min, 999 mls/hr


Oxytocin/Sodium Chloride (Oxytocin 30 Unit/500 Ml-Ns)  30 unit in 500 mls @ 500 

mls/hr IV TITRATE [facility]


Tranexamic Acid 1,000 mg/ (Sodium Chloride)  110 mls @ 660 mls/hr IV ONETIME PRN


   PRN Reason: Bleeding


Clindamycin Phosphate 900 mg/ (Premix)  50 mls @ 100 mls/hr IV Q8H [facility]


   Last Admin: 19 01:10 Dose:  100 mls/hr


Gentamicin Sulfate 420 mg/ (Sodium Chloride)  110.5 mls @ 221 mls/hr IV Q24H [facility]


   Last Admin: 19 10:28 Dose:  221 mls/hr


Ibuprofen (Motrin)  400 mg PO Q4H PRN


   PRN Reason: Pain


Ibuprofen (Motrin)  800 mg PO Q6H PRN


   PRN Reason: Pain


   Last Admin: 19 16:39 Dose:  800 mg


Lidocaine HCl (Xylocaine 1%)  50 ml INJECT ONETIME PRN


   PRN Reason: Laceration repair


Misoprostol (Cytotec)  200 mcg PO ONETIME PRN


   PRN Reason: Post Partum Hemorrhage


Nalbuphine HCl (Nubain)  10 mg IVPUSH Q1H PRN


   PRN Reason: Pain (severe 7-10)


Ondansetron HCl (Zofran)  4 mg IVPUSH Q6H PRN


   PRN Reason: Nausea/Vomiting


Sodium Chloride (Saline Flush)  10 ml FLUSH ASDIRECTED PRN


   PRN Reason: Keep Vein Open


Sodium Chloride (Saline Flush)  2.5 ml FLUSH ASDIRECTED PRN


   PRN Reason: Keep Vein Open


Sodium Chloride (Normal Saline)  10 ml IV ASDIRECTED PRN


   PRN Reason: IV Use


Sterile Water (Sterile Water For Irrigation)  1,000 ml IRR ASDIRECTED PRN


   PRN Reason: delivery


   Last Admin: 19 06:10 Dose:  1,000 ml


Terbutaline Sulfate (Brethine)  0.25 mg SUBCUT ASDIRECTED PRN


   PRN Reason: Tacysystole


Witch Hazel (Tucks)  1 pad TOP ASDIRECTED PRN


   PRN Reason: comfort care





Discontinued Medications





Fentanyl (Sublimaze) Confirm Administered Dose 100 mcg .ROUTE .STK-MED ONE


   Stop: 19 01:58


   Last Admin: 19 20:00 Dose:  Not Given


Ropivacaine (Naropin 0.2%) Confirm Administered Dose 100 mls @ as directed 

.ROUTE .STK-MED ONE


   Stop: 19 01:58


   Last Admin: 19 20:00 Dose:  Not Given


Ampicillin Sodium/Sulbactam (Sodium 3 gm/ Sodium Chloride)  100 mls @ 200 mls/

hr IV Q6H [facility]


   Last Admin: 09/15/19 08:32 Dose:  200 mls/hr


Methylergonovine Maleate (Methergine)  0.2 mg IM ASDIRECTED PRN


   PRN Reason: Post Partum Hemorrhage


Ropivacaine (Naropin 0.2%) Confirm Administered Dose 20 ml .ROUTE .STK-MED ONE


   Stop: 19 01:58


   Last Admin: 19 19:59 Dose:  Not Given











- Infant Interaction


Infant Disposition, Postpartum:  in Room with Family


Infant Interaction: Holding Infant


Infant Feeding:  Infant; Nursed Well


Support Person: 





- Postpartum Recovery Exam


Fundal Tone: Firm


Fundal Level: 2 Fingerbreadths Below Umbilicus


Fundal Placement: Midline


Lochia Amount: Scant


Lochia Color: Rubra/Red


Perineum Description: Intact, Minimal Bruising/Swelling


Episiotomy/Laceration: None


Bladder Status: Voiding


Urinary Elimination: Voided





- Exam


General: Alert, Oriented


HEENT: Pupils Equal


Neck: Supple


Lungs: Clear to Auscultation, Normal Respiratory Effort


Cardiovascular: Regular Rate, Regular Rhythm


GI/Abdominal Exam: Normal Bowel Sounds, Soft, Non-Tender, No Distention, No Mass


Extremities: Normal Inspection, Non-Tender, No Pedal Edema


Skin: Warm, Dry, Intact


Neurological: No New Focal Deficit


Psy/Mental Status: Alert, Normal Affect, Normal Mood





- Problem List Review


Problem List Initiated/Reviewed/Updated: Yes





- Assessment


Assessment:: 





30 yo P1 s/p   PPD4 with postpartum fever, afebrile for 36 hours on 

Gentamicin and clindamycin.   Vitals are stable.  Cultures and workup is 

negative, she is feeling well and is comfortable being discharged today.  





- Plan


Plan:: 


Dismiss to home.  Precautions reviewed.

## 2020-03-25 ENCOUNTER — HOSPITAL ENCOUNTER (EMERGENCY)
Dept: HOSPITAL 56 - MW.ED | Age: 30
Discharge: HOME | End: 2020-03-25
Payer: COMMERCIAL

## 2020-03-25 DIAGNOSIS — S09.90XA: Primary | ICD-10-CM

## 2020-03-25 DIAGNOSIS — Z91.048: ICD-10-CM
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
## 2020-03-25 DIAGNOSIS — Z88.8: ICD-10-CM

## 2020-03-25 PROCEDURE — 99283 EMERGENCY DEPT VISIT LOW MDM: CPT
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
